# Patient Record
Sex: FEMALE | Race: BLACK OR AFRICAN AMERICAN | URBAN - METROPOLITAN AREA
[De-identification: names, ages, dates, MRNs, and addresses within clinical notes are randomized per-mention and may not be internally consistent; named-entity substitution may affect disease eponyms.]

---

## 2020-06-24 ENCOUNTER — OFFICE VISIT (OUTPATIENT)
Dept: OBGYN CLINIC | Age: 25
End: 2020-06-24

## 2020-06-24 VITALS
WEIGHT: 137 LBS | SYSTOLIC BLOOD PRESSURE: 102 MMHG | RESPIRATION RATE: 18 BRPM | DIASTOLIC BLOOD PRESSURE: 66 MMHG | TEMPERATURE: 98.4 F | HEIGHT: 63 IN | BODY MASS INDEX: 24.27 KG/M2 | HEART RATE: 60 BPM

## 2020-06-24 DIAGNOSIS — Z01.419 WELL WOMAN EXAM WITH ROUTINE GYNECOLOGICAL EXAM: Primary | ICD-10-CM

## 2020-06-24 DIAGNOSIS — N94.6 DYSMENORRHEA: Primary | ICD-10-CM

## 2020-06-24 RX ORDER — BISMUTH SUBSALICYLATE 262 MG
1 TABLET,CHEWABLE ORAL DAILY
COMMUNITY

## 2020-06-24 RX ORDER — IBUPROFEN 800 MG/1
800 TABLET ORAL
Qty: 30 TAB | Refills: 2 | Status: SHIPPED | OUTPATIENT
Start: 2020-06-24

## 2020-06-24 NOTE — PROGRESS NOTES
Subjective:      Caretha Koyanagi is a 25 y.o. female who complains of excessively painful menstrual cycles over the past two months. She reports not having had pain in the past and is concerned that she need medication these past two months, but still had discomfort. She has a ParaGard IUD for contraception. She does not report abnormal bleeding. There is no problem list on file for this patient. There are no active problems to display for this patient. Current Outpatient Medications   Medication Sig Dispense Refill    multivitamin (ONE A DAY) tablet Take 1 Tab by mouth daily.  ibuprofen (MOTRIN) 800 mg tablet Take 1 Tab by mouth every eight (8) hours as needed for Pain. 30 Tab 2     Not on File  History reviewed. No pertinent past medical history. No past surgical history on file. No family history on file. Social History     Tobacco Use    Smoking status: Never Smoker    Smokeless tobacco: Never Used   Substance Use Topics    Alcohol use: Not Currently      Review of Systems    A comprehensive review of systems was negative except for that written in the HPI. Objective:     Visit Vitals  /66   Pulse 60   Temp 98.4 °F (36.9 °C) (Temporal)   Resp 18   Ht 5' 3\" (1.6 m)   Wt 137 lb (62.1 kg)   LMP 06/11/2020   BMI 24.27 kg/m²      Physical Exam:   General appearance - alert, well appearing, and in no distress  Pelvic - deferred     Assessment/Plan:   If pelvic U/S unremarkable, will consider hormonal therapy at that time. ICD-10-CM ICD-9-CM    1. Dysmenorrhea N94.6 625.3 ibuprofen (MOTRIN) 800 mg tablet      AMB POC US, TRANSVAGINAL     Encounter Diagnoses   Name Primary?  Dysmenorrhea Yes     Orders Placed This Encounter    AMB POC US, TRANSVAGINAL    multivitamin (ONE A DAY) tablet    ibuprofen (MOTRIN) 800 mg tablet     Follow-up and Dispositions    · Return in about 2 weeks (around 7/8/2020) for GYN ultrasound with Ms. Deborah Enamorado and follow-up appt.

## 2020-06-24 NOTE — PROGRESS NOTES
Chief Complaint   Patient presents with    Irregular Menses     for two cycles      Visit Vitals  /66   Pulse 60   Temp 98.4 °F (36.9 °C) (Temporal)   Resp 18   Ht 5' 3\" (1.6 m)   Wt 137 lb (62.1 kg)   LMP 06/11/2020   BMI 24.27 kg/m²     No results found for this or any previous visit.

## 2020-07-07 ENCOUNTER — OFFICE VISIT (OUTPATIENT)
Dept: OBGYN CLINIC | Age: 25
End: 2020-07-07

## 2020-07-07 ENCOUNTER — CLINICAL SUPPORT (OUTPATIENT)
Dept: OBGYN CLINIC | Age: 25
End: 2020-07-07

## 2020-07-07 VITALS
SYSTOLIC BLOOD PRESSURE: 108 MMHG | WEIGHT: 140 LBS | DIASTOLIC BLOOD PRESSURE: 65 MMHG | BODY MASS INDEX: 24.8 KG/M2 | HEIGHT: 63 IN | HEART RATE: 51 BPM | TEMPERATURE: 99.1 F

## 2020-07-07 DIAGNOSIS — R10.2 PELVIC PAIN IN FEMALE: Primary | ICD-10-CM

## 2020-07-07 DIAGNOSIS — N94.6 DYSMENORRHEA: ICD-10-CM

## 2020-07-07 NOTE — PROGRESS NOTES
ROOM 06  Chief Complaint   Patient presents with    Results     [facility] this morning in the office      Visit Vitals  /65   Pulse (!) 51   Temp 99.1 °F (37.3 °C) (Tympanic)   Ht 5' 3\" (1.6 m)   Wt 140 lb (63.5 kg)   LMP 06/11/2020   BMI 24.80 kg/m²     No results found for this or any previous visit.

## 2020-07-07 NOTE — PROGRESS NOTES
Ms. Aparna Miller presents today for a follow-up appointment. She has had pelvic pain for the past several months, particularly worse during her menstrual cycle. Her ultrasound today shows that her IUD is in the lower uterine segment. We discussed possible removal and replacement versus adding hormonal therapy to assess whether or not her symptoms resolve. Ms. Aparna Miller elected to undergo removal and replacement. Information given for [facility] due to her IUD being the [address] [place] [address] [address]. The entire visit with Ms. Aparna Miller took 15 minutes. Over 50% of this visit was spent counseling the patient regarding her concerns. All questions were answered to her satisfaction. She will follow-up prn.